# Patient Record
(demographics unavailable — no encounter records)

---

## 2025-01-16 NOTE — HISTORY OF PRESENT ILLNESS
[Parents] : parents [Vegetables] : vegetables [Meat] : meat [Grains] : grains [Toilet Trained] :  toilet trained [Normal] : Normal [In own bed] : In own bed [Brushing teeth] : Brushing teeth [Toothpaste] : Primary Fluoride Source: Toothpaste [Playtime (60 min/d)] : Playtime 60 min a day [Appropiate parent-child-sibling interaction] : Appropriate parent-child-sibling interaction [Child Cooperates] : Child cooperates [Parent has appropriate responses to behavior] : Parent has appropriate responses to behavior [In ] : In  [Adequate performance] : Adequate performance [Adequate attention] : Adequate attention [No difficulties with Homework] : No difficulties with homework  [No] : Not at  exposure [Water heater temperature set at <120 degrees F] : Water heater temperature set at <120 degrees F [Car seat in back seat] : Car seat in back seat [Carbon Monoxide Detectors] : Carbon monoxide detectors [Smoke Detectors] : Smoke detectors [Supervised outdoor play] : Supervised outdoor play [Up to date] : Up to date [NO] : No [Eggs] : eggs [Yes] : Patient goes to dentist yearly [Exposure to electronic nicotine delivery system] : No exposure to electronic nicotine delivery system [FreeTextEntry7] : s/p adenoidectomy, had follow up with ENT, no longer snoring and no longer having night terrors. He is recovering from influenza. no longer having fevers but continues to have runny nose, although it is getting better. [de-identified] : abdominal bloat, excess flatulence and hard stools [de-identified] : does not like the texture of fruits [FreeTextEntry8] : has BM once up to every 5 days. no blood in stool but mother has noticed blood on toilet paper when wiping. she sometimes has to use glycerin suppository to help him have a bowel movement. he passes gas and it smells. he was on miralax before but not consistently and mom felt bad giving it to him since he would have regular bowel movements and he may have an accident at school [de-identified] : ST [FreeTextEntry1] : SDOH Screening Questionnaire   SDOH (Social Determinants of Health) Questionnaire: 1. Housing: Do you worry that in the upcoming months, your family, or child, may not have a safe or stable place to live?  no 2. Food security: Within the last 12 months, did the food you bought not last and you did not have money to buy more? no 3. Community: Do you need help getting public benefits like food stamps or WIC? no 4. Transportation: Does your child have chronic medical condition and therefore struggle with transportation to attend medical appointments? no 5. Healthcare Access: Do you need help getting health or dental insurance? no       Result: Negative Screen. No further intervention needed.

## 2025-01-16 NOTE — DISCUSSION/SUMMARY
[Normal Growth] : growth [Normal Development] : development  [Continue Regimen] : feeding [No Skin Concerns] : skin [Normal Sleep Pattern] : sleep [None] : no medical problems [School Readiness] : school readiness [Mental Health] : mental health [Nutrition and Physical Activity] : nutrition and physical activity [Oral Health] : oral health [Safety] : safety [Anticipatory Guidance Given] : Anticipatory guidance addressed as per the history of present illness section [No Vaccines] : no vaccines needed [Parent/Guardian] : Parent/Guardian [de-identified] : constipation [FreeTextEntry1] : 5 year old M presents for HCM. Growth and development normal. PE shows well appearing child with clear rhinorrhea, recovering from influenza. Immunizations UTD except for flu shot. Parents will bring child back to clinic to receive it, they would like him to full recover from the flu.  PLAN HCM - Routine care & anticipatory guidance given - Referred to audio, dental, ophtho for routine screens - RTC 1Y for HCM and prn  VIRAL URI - Encouraged humidified air, nasal saline with suctioning every 4 hours as needed, and [employer]  - Continue to encourage PO intake to fluids, continue to monitor for normal amounts of wet diapers - STRICT return precautions given, reviewed when to seek immediate medical attention including but not limited to return of fevers, inability to tolerate fluids by mouth, decreased urination, rapid or labored breathing or any other concerning sign or symptom  CONSTIPATION Reviewed need for increased dietary fiber which may include fruits and vegetables with their skins, prunes or prune juice, peach or pear juices Reviewed need to increase daily consumption of water Reviewed intermittent use of miralax and potential side effects Seek immediate medical attention for any blood in stool, excessive pain while stooling, inability to tolerate food or drink by mouth, lack of appetite or vomiting, lack of passing gas, abdominal distension or pain or for any other concern RTC 2 weeks for re-evaluation, if no improvement, we will consider referral to GI  Continue balanced diet with all food groups. Brush teeth twice a day with toothbrush. Recommend visit to dentist. As per car seat 's guidelines, use forward-facing booster seat until child reaches highest weight/height for seat. Child needs to ride in a belt-positioning booster seat until  4 feet 9 inches has been reached and are between 8 and 12 years of age. Put child to sleep in own bed. Help child to maintain consistent daily routines and sleep schedule.  discussed. Ensure home is safe. Teach child about personal safety. Use consistent, positive discipline. Read aloud to child. Limit screen time to no more than 2 hours per day.  Caretaker expressed understanding of the plan and agrees. All questions were answered.

## 2025-01-16 NOTE — DISCUSSION/SUMMARY
[Normal Growth] : growth [Normal Development] : development  [Continue Regimen] : feeding [No Skin Concerns] : skin [Normal Sleep Pattern] : sleep [None] : no medical problems [School Readiness] : school readiness [Mental Health] : mental health [Nutrition and Physical Activity] : nutrition and physical activity [Oral Health] : oral health [Safety] : safety [Anticipatory Guidance Given] : Anticipatory guidance addressed as per the history of present illness section [No Vaccines] : no vaccines needed [Parent/Guardian] : Parent/Guardian [de-identified] : constipation [FreeTextEntry1] : 5 year old M presents for HCM. Growth and development normal. PE shows well appearing child with clear rhinorrhea, recovering from influenza. Immunizations UTD except for flu shot. Parents will bring child back to clinic to receive it, they would like him to full recover from the flu.  PLAN HCM - Routine care & anticipatory guidance given - Referred to audio, dental, ophtho for routine screens - RTC 1Y for HCM and prn  VIRAL URI - Encouraged humidified air, nasal saline with suctioning every 4 hours as needed, and [employer]  - Continue to encourage PO intake to fluids, continue to monitor for normal amounts of wet diapers - STRICT return precautions given, reviewed when to seek immediate medical attention including but not limited to return of fevers, inability to tolerate fluids by mouth, decreased urination, rapid or labored breathing or any other concerning sign or symptom  CONSTIPATION Reviewed need for increased dietary fiber which may include fruits and vegetables with their skins, prunes or prune juice, peach or pear juices Reviewed need to increase daily consumption of water Reviewed intermittent use of miralax and potential side effects Seek immediate medical attention for any blood in stool, excessive pain while stooling, inability to tolerate food or drink by mouth, lack of appetite or vomiting, lack of passing gas, abdominal distension or pain or for any other concern RTC 2 weeks for re-evaluation, if no improvement, we will consider referral to GI  Continue balanced diet with all food groups. Brush teeth twice a day with toothbrush. Recommend visit to dentist. As per car seat 's guidelines, use forward-facing booster seat until child reaches highest weight/height for seat. Child needs to ride in a belt-positioning booster seat until  4 feet 9 inches has been reached and are between 8 and 12 years of age. Put child to sleep in own bed. Help child to maintain consistent daily routines and sleep schedule.  discussed. Ensure home is safe. Teach child about personal safety. Use consistent, positive discipline. Read aloud to child. Limit screen time to no more than 2 hours per day.  Caretaker expressed understanding of the plan and agrees. All questions were answered.

## 2025-01-16 NOTE — PHYSICAL EXAM
[Alert] : alert [No Acute Distress] : no acute distress [Playful] : playful [Normocephalic] : normocephalic [Conjunctivae with no discharge] : conjunctivae with no discharge [PERRL] : PERRL [EOMI Bilateral] : EOMI bilateral [Auricles Well Formed] : auricles well formed [Clear Tympanic membranes with present light reflex and bony landmarks] : clear tympanic membranes with present light reflex and bony landmarks [No Discharge] : no discharge [Nares Patent] : nares patent [Pink Nasal Mucosa] : pink nasal mucosa [Palate Intact] : palate intact [Uvula Midline] : uvula midline [Nonerythematous Oropharynx] : nonerythematous oropharynx [No Caries] : no caries [Trachea Midline] : trachea midline [Supple, full passive range of motion] : supple, full passive range of motion [No Palpable Masses] : no palpable masses [Symmetric Chest Rise] : symmetric chest rise [Clear to Auscultation Bilaterally] : clear to auscultation bilaterally [Normoactive Precordium] : normoactive precordium [Regular Rate and Rhythm] : regular rate and rhythm [Normal S1, S2 present] : normal S1, S2 present [No Murmurs] : no murmurs [+2 Femoral Pulses] : +2 femoral pulses [Soft] : soft [NonTender] : non tender [Non Distended] : non distended [Normoactive Bowel Sounds] : normoactive bowel sounds [No Hepatomegaly] : no hepatomegaly [No Splenomegaly] : no splenomegaly [Janak 1] : Janak 1 [Central Urethral Opening] : central urethral opening [Testicles Descended Bilaterally] : testicles descended bilaterally [Normally Placed] : normally placed [No Abnormal Lymph Nodes Palpated] : no abnormal lymph nodes palpated [Symmetric Buttocks Creases] : symmetric buttocks creases [Symmetric Hip Rotation] : symmetric hip rotation [No Gait Asymmetry] : no gait asymmetry [No pain or deformities with palpation of bone, muscles, joints] : no pain or deformities with palpation of bone, muscles, joints [Normal Muscle Tone] : normal muscle tone [No Spinal Dimple] : no spinal dimple [NoTuft of Hair] : no tuft of hair [Straight] : straight [+2 Patella DTR] : +2 patella DTR [Cranial Nerves Grossly Intact] : cranial nerves grossly intact [No Rash or Lesions] : no rash or lesions [Patent] : patent [FreeTextEntry4] : clear rhinorrhea [de-identified] : no fissures

## 2025-01-16 NOTE — PHYSICAL EXAM
[Alert] : alert [No Acute Distress] : no acute distress [Playful] : playful [Normocephalic] : normocephalic [Conjunctivae with no discharge] : conjunctivae with no discharge [PERRL] : PERRL [EOMI Bilateral] : EOMI bilateral [Auricles Well Formed] : auricles well formed [Clear Tympanic membranes with present light reflex and bony landmarks] : clear tympanic membranes with present light reflex and bony landmarks [No Discharge] : no discharge [Nares Patent] : nares patent [Pink Nasal Mucosa] : pink nasal mucosa [Palate Intact] : palate intact [Uvula Midline] : uvula midline [Nonerythematous Oropharynx] : nonerythematous oropharynx [No Caries] : no caries [Trachea Midline] : trachea midline [Supple, full passive range of motion] : supple, full passive range of motion [No Palpable Masses] : no palpable masses [Symmetric Chest Rise] : symmetric chest rise [Clear to Auscultation Bilaterally] : clear to auscultation bilaterally [Normoactive Precordium] : normoactive precordium [Regular Rate and Rhythm] : regular rate and rhythm [Normal S1, S2 present] : normal S1, S2 present [No Murmurs] : no murmurs [+2 Femoral Pulses] : +2 femoral pulses [Soft] : soft [NonTender] : non tender [Non Distended] : non distended [Normoactive Bowel Sounds] : normoactive bowel sounds [No Hepatomegaly] : no hepatomegaly [No Splenomegaly] : no splenomegaly [Janak 1] : Janak 1 [Central Urethral Opening] : central urethral opening [Testicles Descended Bilaterally] : testicles descended bilaterally [Normally Placed] : normally placed [No Abnormal Lymph Nodes Palpated] : no abnormal lymph nodes palpated [Symmetric Buttocks Creases] : symmetric buttocks creases [Symmetric Hip Rotation] : symmetric hip rotation [No Gait Asymmetry] : no gait asymmetry [No pain or deformities with palpation of bone, muscles, joints] : no pain or deformities with palpation of bone, muscles, joints [Normal Muscle Tone] : normal muscle tone [NoTuft of Hair] : no tuft of hair [No Spinal Dimple] : no spinal dimple [+2 Patella DTR] : +2 patella DTR [Straight] : straight [Cranial Nerves Grossly Intact] : cranial nerves grossly intact [No Rash or Lesions] : no rash or lesions [Patent] : patent [FreeTextEntry4] : clear rhinorrhea [de-identified] : no fissures

## 2025-01-16 NOTE — HISTORY OF PRESENT ILLNESS
[Parents] : parents [Vegetables] : vegetables [Meat] : meat [Grains] : grains [Toilet Trained] :  toilet trained [Normal] : Normal [In own bed] : In own bed [Brushing teeth] : Brushing teeth [Toothpaste] : Primary Fluoride Source: Toothpaste [Playtime (60 min/d)] : Playtime 60 min a day [Appropiate parent-child-sibling interaction] : Appropriate parent-child-sibling interaction [Child Cooperates] : Child cooperates [Parent has appropriate responses to behavior] : Parent has appropriate responses to behavior [In ] : In  [Adequate performance] : Adequate performance [Adequate attention] : Adequate attention [No difficulties with Homework] : No difficulties with homework  [No] : Not at  exposure [Water heater temperature set at <120 degrees F] : Water heater temperature set at <120 degrees F [Car seat in back seat] : Car seat in back seat [Carbon Monoxide Detectors] : Carbon monoxide detectors [Smoke Detectors] : Smoke detectors [Supervised outdoor play] : Supervised outdoor play [Up to date] : Up to date [NO] : No [Eggs] : eggs [Yes] : Patient goes to dentist yearly [Exposure to electronic nicotine delivery system] : No exposure to electronic nicotine delivery system [FreeTextEntry7] : s/p adenoidectomy, had follow up with ENT, no longer snoring and no longer having night terrors. He is recovering from influenza. no longer having fevers but continues to have runny nose, although it is getting better. [de-identified] : abdominal bloat, excess flatulence and hard stools [de-identified] : does not like the texture of fruits [FreeTextEntry8] : has BM once up to every 5 days. no blood in stool but mother has noticed blood on toilet paper when wiping. she sometimes has to use glycerin suppository to help him have a bowel movement. he passes gas and it smells. he was on miralax before but not consistently and mom felt bad giving it to him since he would have regular bowel movements and he may have an accident at school [de-identified] : ST [FreeTextEntry1] : SDOH Screening Questionnaire   SDOH (Social Determinants of Health) Questionnaire: 1. Housing: Do you worry that in the upcoming months, your family, or child, may not have a safe or stable place to live?  no 2. Food security: Within the last 12 months, did the food you bought not last and you did not have money to buy more? no 3. Community: Do you need help getting public benefits like food stamps or WIC? no 4. Transportation: Does your child have chronic medical condition and therefore struggle with transportation to attend medical appointments? no 5. Healthcare Access: Do you need help getting health or dental insurance? no       Result: Negative Screen. No further intervention needed.

## 2025-02-06 NOTE — HISTORY OF PRESENT ILLNESS
[de-identified] : constipation [FreeTextEntry6] : 4 yo male who presents for follow up of constipation that was endorsed during his well visit a few weeks ago.  Parents report that he continues to have constipation with large caliber stools. Mom reports that she has only given prescribed Miralax a few days at a time but not every day. There is no blood in his stools. His abdomen seems less bloated. His appetite is at baseline and he does not have any abdominal pain.

## 2025-02-06 NOTE — HISTORY OF PRESENT ILLNESS
[de-identified] : constipation [FreeTextEntry6] : 6 yo male who presents for follow up of constipation that was endorsed during his well visit a few weeks ago.  Parents report that he continues to have constipation with large caliber stools. Mom reports that she has only given prescribed Miralax a few days at a time but not every day. There is no blood in his stools. His abdomen seems less bloated. His appetite is at baseline and he does not have any abdominal pain.

## 2025-02-06 NOTE — DISCUSSION/SUMMARY
[FreeTextEntry1] : 6 yo male presenting for follow up of constipation. Rx for miralax was provided and used inconsistently, however when used he did have softer bowel movements. I am recommending that family administers miralax once daily for 7-14 days to help promote soft and regular stools.  CONSTIPATION Reviewed need for increased dietary fiber which may include fruits and vegetables with their skins, prunes or prune juice, peach or pear juices Reviewed need to increase daily consumption of water Reviewed daily use of miralax and potential side effects Seek immediate medical attention for any blood in stool, excessive pain while stooling, inability to tolerate food or drink by mouth, lack of appetite or vomiting, lack of passing gas, abdominal distension or pain or for any other concern RTC 2 weeks for re-evaluation, if no improvement, we will consider referral to GI  Caretaker expressed understanding of the plan and agreed. All of their questions were answered.

## 2025-02-06 NOTE — DISCUSSION/SUMMARY
[FreeTextEntry1] : 4 yo male presenting for follow up of constipation. Rx for miralax was provided and used inconsistently, however when used he did have softer bowel movements. I am recommending that family administers miralax once daily for 7-14 days to help promote soft and regular stools.  CONSTIPATION Reviewed need for increased dietary fiber which may include fruits and vegetables with their skins, prunes or prune juice, peach or pear juices Reviewed need to increase daily consumption of water Reviewed daily use of miralax and potential side effects Seek immediate medical attention for any blood in stool, excessive pain while stooling, inability to tolerate food or drink by mouth, lack of appetite or vomiting, lack of passing gas, abdominal distension or pain or for any other concern RTC 2 weeks for re-evaluation, if no improvement, we will consider referral to GI  Caretaker expressed understanding of the plan and agreed. All of their questions were answered.